# Patient Record
Sex: MALE | URBAN - NONMETROPOLITAN AREA
[De-identification: names, ages, dates, MRNs, and addresses within clinical notes are randomized per-mention and may not be internally consistent; named-entity substitution may affect disease eponyms.]

---

## 2024-06-27 ENCOUNTER — HOSPITAL ENCOUNTER (OUTPATIENT)
Age: 44
Setting detail: SPECIMEN
Discharge: HOME | End: 2024-06-27
Payer: COMMERCIAL

## 2024-06-27 PROCEDURE — 86735 MUMPS ANTIBODY: CPT

## 2024-06-27 PROCEDURE — 86762 RUBELLA ANTIBODY: CPT

## 2024-06-27 PROCEDURE — 86765 RUBEOLA ANTIBODY: CPT

## 2024-06-27 PROCEDURE — 86787 VARICELLA-ZOSTER ANTIBODY: CPT

## 2024-06-28 LAB
MEV IGG SER-ACNC: 1.59
MUV IGG SER IA-ACNC: 0.55
RUBV IGG SERPL QL IA: 85.1 IU/ML
VZV IGG SER QL IA: 2.18

## 2024-08-06 ENCOUNTER — OFFICE VISIT (OUTPATIENT)
Dept: FAMILY MEDICINE CLINIC | Age: 44
End: 2024-08-06
Payer: COMMERCIAL

## 2024-08-06 VITALS
SYSTOLIC BLOOD PRESSURE: 136 MMHG | DIASTOLIC BLOOD PRESSURE: 92 MMHG | OXYGEN SATURATION: 99 % | HEART RATE: 79 BPM | HEIGHT: 69 IN | WEIGHT: 257 LBS | BODY MASS INDEX: 38.06 KG/M2

## 2024-08-06 DIAGNOSIS — Z13.220 SCREENING FOR HYPERLIPIDEMIA: ICD-10-CM

## 2024-08-06 DIAGNOSIS — Z80.0 FAMILY HISTORY OF COLON CANCER: ICD-10-CM

## 2024-08-06 DIAGNOSIS — I10 PRIMARY HYPERTENSION: Primary | ICD-10-CM

## 2024-08-06 DIAGNOSIS — Z13.1 ENCOUNTER FOR SCREENING EXAMINATION FOR IMPAIRED GLUCOSE REGULATION AND DIABETES MELLITUS: ICD-10-CM

## 2024-08-06 DIAGNOSIS — K58.0 IRRITABLE BOWEL SYNDROME WITH DIARRHEA: ICD-10-CM

## 2024-08-06 DIAGNOSIS — Z85.820 HISTORY OF MELANOMA: ICD-10-CM

## 2024-08-06 DIAGNOSIS — Z12.11 SCREENING FOR MALIGNANT NEOPLASM OF COLON: ICD-10-CM

## 2024-08-06 DIAGNOSIS — K21.9 GASTROESOPHAGEAL REFLUX DISEASE WITHOUT ESOPHAGITIS: ICD-10-CM

## 2024-08-06 DIAGNOSIS — F41.1 GAD (GENERALIZED ANXIETY DISORDER): ICD-10-CM

## 2024-08-06 DIAGNOSIS — Q82.5: ICD-10-CM

## 2024-08-06 DIAGNOSIS — Z13.0 SCREENING FOR DEFICIENCY ANEMIA: ICD-10-CM

## 2024-08-06 PROCEDURE — 3080F DIAST BP >= 90 MM HG: CPT | Performed by: NURSE PRACTITIONER

## 2024-08-06 PROCEDURE — 99203 OFFICE O/P NEW LOW 30 MIN: CPT | Performed by: NURSE PRACTITIONER

## 2024-08-06 PROCEDURE — 3075F SYST BP GE 130 - 139MM HG: CPT | Performed by: NURSE PRACTITIONER

## 2024-08-06 RX ORDER — VENLAFAXINE 75 MG/1
75 TABLET ORAL 3 TIMES DAILY
COMMUNITY

## 2024-08-06 RX ORDER — ESOMEPRAZOLE MAGNESIUM 40 MG/1
40 CAPSULE, DELAYED RELEASE ORAL
Qty: 90 CAPSULE | Refills: 1 | Status: SHIPPED | OUTPATIENT
Start: 2024-08-06

## 2024-08-06 RX ORDER — LOSARTAN POTASSIUM 50 MG/1
50 TABLET ORAL DAILY
COMMUNITY
End: 2024-08-06 | Stop reason: SDUPTHER

## 2024-08-06 RX ORDER — HYDROCHLOROTHIAZIDE 25 MG/1
25 TABLET ORAL DAILY
Qty: 30 TABLET | Refills: 2 | Status: SHIPPED | OUTPATIENT
Start: 2024-08-06

## 2024-08-06 RX ORDER — HYDROCHLOROTHIAZIDE 25 MG/1
25 TABLET ORAL DAILY
COMMUNITY
End: 2024-08-06 | Stop reason: SDUPTHER

## 2024-08-06 RX ORDER — LOSARTAN POTASSIUM 50 MG/1
50 TABLET ORAL DAILY
Qty: 30 TABLET | Refills: 2 | Status: SHIPPED | OUTPATIENT
Start: 2024-08-06

## 2024-08-06 SDOH — ECONOMIC STABILITY: HOUSING INSECURITY
IN THE LAST 12 MONTHS, WAS THERE A TIME WHEN YOU DID NOT HAVE A STEADY PLACE TO SLEEP OR SLEPT IN A SHELTER (INCLUDING NOW)?: NO

## 2024-08-06 SDOH — ECONOMIC STABILITY: FOOD INSECURITY: WITHIN THE PAST 12 MONTHS, THE FOOD YOU BOUGHT JUST DIDN'T LAST AND YOU DIDN'T HAVE MONEY TO GET MORE.: NEVER TRUE

## 2024-08-06 SDOH — ECONOMIC STABILITY: FOOD INSECURITY: WITHIN THE PAST 12 MONTHS, YOU WORRIED THAT YOUR FOOD WOULD RUN OUT BEFORE YOU GOT MONEY TO BUY MORE.: NEVER TRUE

## 2024-08-06 SDOH — ECONOMIC STABILITY: INCOME INSECURITY: HOW HARD IS IT FOR YOU TO PAY FOR THE VERY BASICS LIKE FOOD, HOUSING, MEDICAL CARE, AND HEATING?: NOT HARD AT ALL

## 2024-08-06 ASSESSMENT — ENCOUNTER SYMPTOMS
BACK PAIN: 0
BLOOD IN STOOL: 0
DIARRHEA: 0
COUGH: 0
SORE THROAT: 0
ABDOMINAL PAIN: 0
VOMITING: 0
SHORTNESS OF BREATH: 0
NAUSEA: 0
CONSTIPATION: 0

## 2024-08-06 ASSESSMENT — PATIENT HEALTH QUESTIONNAIRE - PHQ9
SUM OF ALL RESPONSES TO PHQ QUESTIONS 1-9: 0
SUM OF ALL RESPONSES TO PHQ9 QUESTIONS 1 & 2: 0
1. LITTLE INTEREST OR PLEASURE IN DOING THINGS: NOT AT ALL
SUM OF ALL RESPONSES TO PHQ QUESTIONS 1-9: 0
SUM OF ALL RESPONSES TO PHQ QUESTIONS 1-9: 0
2. FEELING DOWN, DEPRESSED OR HOPELESS: NOT AT ALL
SUM OF ALL RESPONSES TO PHQ QUESTIONS 1-9: 0

## 2024-08-06 NOTE — PROGRESS NOTES
HPI Notes    Name: Chris Jackson  : 1980         Chief Complaint:     Chief Complaint   Patient presents with    New Patient     Patient here today to establish care with pcp    Dizziness     Patient states he had an episode yesterday and presented to urgent care in [place]. He's currently off of Effexor and thinks that could be what's causing the dizziness.     Hypertension     Pt currently taking losartan 50mg qd and hctz 25mg QD.  Patient would like refills on medication.    Colon Cancer Screening     Pt would like colon cancer screening, states he had it set up prior to moving to [place] so would like to get that established here.        History of Present Illness:        HPI  Pt is a 42 yo male who reports as a new pt. Pt has known HTN. Pt has been on Losartan 50mg and HCTZ 25mg. Well controlled with medication. Has been out of medication for a couple weeks.     Has been on Effexor 75mg TID. Has been wanting to come off of it. Has been weaning down over the past two weeks. Has been experiencing some dizziness since starting the wean. Saturday (8/3/24) was last dose.     Pt wanting to get referral for colonoscopy. Has never had a colonoscopy. Paternal and maternal grandfathers had colon cancer. Has had symptoms IBS-D symptoms since early childhood. Experiences abd cramping and diarrhea 2-3 times per week. No particular food triggers. Does notice that when he eats \"\" symptoms are better. Takes imodium AD \"once in awhile\".     Has acid reflux almost daily. Has been taking nexium OTC for several years. Symptoms are controlled.     States that sometimes feels a \"twinge of pain\" while urinating. Has seems some \"crystal-like things\" in the toilet at times. Denies blood in urine.     Had melanoma of left upper arm removed in 2021.   Past Medical History:     Past Medical History:   Diagnosis Date    Anxiety     Depression     Hypertension     Skin cancer (melanoma) ([facility])       Reviewed all health

## 2024-08-07 ENCOUNTER — OFFICE VISIT (OUTPATIENT)
Dept: GASTROENTEROLOGY | Age: 44
End: 2024-08-07
Payer: COMMERCIAL

## 2024-08-07 ENCOUNTER — TELEPHONE (OUTPATIENT)
Dept: GASTROENTEROLOGY | Age: 44
End: 2024-08-07

## 2024-08-07 VITALS
SYSTOLIC BLOOD PRESSURE: 158 MMHG | DIASTOLIC BLOOD PRESSURE: 88 MMHG | WEIGHT: 257 LBS | OXYGEN SATURATION: 97 % | RESPIRATION RATE: 18 BRPM | BODY MASS INDEX: 37.95 KG/M2 | HEART RATE: 90 BPM

## 2024-08-07 DIAGNOSIS — K21.9 CHRONIC GERD: ICD-10-CM

## 2024-08-07 DIAGNOSIS — K62.5 RECTAL BLEEDING: Primary | ICD-10-CM

## 2024-08-07 DIAGNOSIS — Z80.0 FAMILY HISTORY OF ESOPHAGEAL CANCER: ICD-10-CM

## 2024-08-07 DIAGNOSIS — Z01.818 PRE-OP TESTING: ICD-10-CM

## 2024-08-07 DIAGNOSIS — Z12.11 COLON CANCER SCREENING: ICD-10-CM

## 2024-08-07 DIAGNOSIS — Z80.0 FAMILY HISTORY OF COLON CANCER: ICD-10-CM

## 2024-08-07 PROCEDURE — 99203 OFFICE O/P NEW LOW 30 MIN: CPT | Performed by: NURSE PRACTITIONER

## 2024-08-07 RX ORDER — ASPIRIN 81 MG/1
81 TABLET ORAL DAILY
COMMUNITY

## 2024-08-07 RX ORDER — POLYETHYLENE GLYCOL 3350, SODIUM CHLORIDE, SODIUM BICARBONATE, POTASSIUM CHLORIDE 420; 11.2; 5.72; 1.48 G/4L; G/4L; G/4L; G/4L
4000 POWDER, FOR SOLUTION ORAL ONCE
Qty: 4000 ML | Refills: 0 | Status: SHIPPED | OUTPATIENT
Start: 2024-08-07 | End: 2024-08-07

## 2024-08-07 ASSESSMENT — ENCOUNTER SYMPTOMS
TROUBLE SWALLOWING: 0
ABDOMINAL PAIN: 1
ANAL BLEEDING: 1
ROS SKIN COMMENTS: SKIN CANCER
COUGH: 1

## 2024-08-07 NOTE — TELEPHONE ENCOUNTER
Chris Jackson     1980        male    [address] [place] [address]                    Legal Guardian no  If yes, Name:       Skilled Facility no     If yes, Name:                                             Home Phone: [phone]         Cell Phone:    Telephone Information:   Mobile [phone]                                           Surgeon: Jonathan   Surgery Date: 8/20/24                      Time:     Procedure: colonoscopy and EGD  Duration:    Diagnosis: rectal bleeding R19.5 family h/x esophageal and colon CA Z80.00 GERD K21.9  CPT Codes:47792, 47463    Important Medical History:  In Epic    First Assistant no  Special Inst/Equip/Implants:     Nickel allergy  no  Latex Allergy: no      Cardiac Device:  No  If yes, need most recent pacemaker interrogation from Cardiologist:  Type of pacemaker:    Anesthesia:    MAC                       Admission Type:  Same Day                        Admit Prior to Day of Surgery: No    Case Location:  Ambulatory            Preadmission Testing:  Phone Call             PAT Date and Time:     Need Preop Cardiac Clearance: no  Need Pre-op/Medical Clearance: no    Does Patient have Cardiologist/physician? Name of Physician:        Special Needs Communication:  Ludivina Lift no    needed no

## 2024-08-07 NOTE — PATIENT INSTRUCTIONS
SURVEY:    You may be receiving a survey from [facility] regarding your visit today.    You may get this in the mail, through your MyChart, or in your email.     Please complete the survey to enable us to provide the highest quality of care to you and your family.    If you cannot score us a very good (5 Stars) on any question, please call the office to discuss how we could of made your experience exceptional.    Thank you!    MD Dr. Nora Parada MD Wendy Williams, ANGELINA-EDWIN Keller LPN Brenda Boehler, LPN Jena Adams, MA    Phone: [phone]  Fax: [phone]    Office Hours:   M-TH 8-5, F: 8-12

## 2024-08-07 NOTE — PROGRESS NOTES
[address] [address]    Chief Complaint   Patient presents with    Colon Cancer Screening     Family history of upper GI cancer: Yes (Paternal Grandfather)  Family history of colon cancer: Yes (maternal grandfather)  Blood in stool: Yes   Unintentional weight loss: No  Abdominal pain: Yes (IBS)  Prior colonoscopy: No  Prior EGD: No  Constipation history: No  Number of bowel movements a day: 3-4  Change in stool caliber: Yes  History of GERD or Acid Reflux: Yes          HPI Chris Jackson is a 43 y.o. old male who has a past medical history of anxiety/depression, hypertension, skin cancer presenting as new GI referral for colon cancer screening colonoscopy preop visit with concerns for rectal bleeding and persistent GERD.    Family history of upper GI cancer:  Yes: Paternal Grandfather with esophageal cancer  Family history of colon cancer: Yes: Maternal Grandfather colon cancer  Blood in stool: Yes: infrequent, bright red and on the toilet tissue  Unintentional weight loss: No  Abdominal pain: Yes: Related to his IBS and ongoing from childhood  Prior colonoscopy: No  Prior EGD: No  Constipation history: No  Number of bowel movements a day: 3-4  Change in stool caliber: Yes  History of GERD or Acid Reflux: Yes: years of symptoms.  Has been having a cough felt related to GERD.  No dysphagia       Past Surgical History:   Procedure Laterality Date    SKIN CANCER EXCISION  03/01/2021    from left arm         Current Outpatient Medications   Medication Sig Dispense Refill    polyethylene glycol-electrolytes (NULYTELY WITH FLAVOR PACKS) 420 g solution Take 4,000 mLs by mouth once for 1 dose 4000 mL 0    aspirin 81 MG EC tablet Take 1 tablet by mouth daily      venlafaxine (EFFEXOR) 75 MG tablet Take 1 tablet by mouth 3 times daily      hydroCHLOROthiazide (HYDRODIURIL) 25 MG tablet Take 1 tablet by mouth daily 30 tablet 2    losartan (COZAAR) 50 MG tablet Take 1 tablet by mouth daily 30 tablet 2

## 2024-08-09 ENCOUNTER — HOSPITAL ENCOUNTER (OUTPATIENT)
Age: 44
Discharge: HOME | End: 2024-08-09
Payer: COMMERCIAL

## 2024-08-09 ENCOUNTER — TELEPHONE (OUTPATIENT)
Dept: FAMILY MEDICINE CLINIC | Age: 44
End: 2024-08-09

## 2024-08-09 DIAGNOSIS — Z13.1 ENCOUNTER FOR SCREENING EXAMINATION FOR IMPAIRED GLUCOSE REGULATION AND DIABETES MELLITUS: ICD-10-CM

## 2024-08-09 DIAGNOSIS — Z13.0 SCREENING FOR DEFICIENCY ANEMIA: ICD-10-CM

## 2024-08-09 DIAGNOSIS — Z13.220 SCREENING FOR HYPERLIPIDEMIA: ICD-10-CM

## 2024-08-09 DIAGNOSIS — F41.1 GAD (GENERALIZED ANXIETY DISORDER): ICD-10-CM

## 2024-08-09 DIAGNOSIS — Z01.818 PRE-OP TESTING: ICD-10-CM

## 2024-08-09 DIAGNOSIS — I10 PRIMARY HYPERTENSION: ICD-10-CM

## 2024-08-09 LAB
25(OH)D3 SERPL-MCNC: 22.6 NG/ML (ref 30–100)
ALBUMIN SERPL-MCNC: 4.3 G/DL (ref 3.5–5.2)
ALP SERPL-CCNC: 74 U/L (ref 40–129)
ALT SERPL-CCNC: 32 U/L (ref 5–41)
ANION GAP SERPL CALCULATED.3IONS-SCNC: 15 MMOL/L (ref 9–17)
AST SERPL-CCNC: 28 U/L
BASOPHILS # BLD: 0.02 K/UL (ref 0–0.2)
BASOPHILS NFR BLD: 0 % (ref 0–2)
BILIRUB SERPL-MCNC: 0.3 MG/DL (ref 0.3–1.2)
BUN SERPL-MCNC: 19 MG/DL (ref 6–20)
BUN/CREAT SERPL: 21 (ref 9–20)
CALCIUM SERPL-MCNC: 9.6 MG/DL (ref 8.6–10.4)
CHLORIDE SERPL-SCNC: 100 MMOL/L (ref 98–107)
CHOLEST SERPL-MCNC: 265 MG/DL (ref 0–199)
CHOLESTEROL/HDL RATIO: 7
CO2 SERPL-SCNC: 26 MMOL/L (ref 20–31)
CREAT SERPL-MCNC: 0.9 MG/DL (ref 0.7–1.2)
EOSINOPHIL # BLD: 0.12 K/UL (ref 0–0.4)
EOSINOPHILS RELATIVE PERCENT: 2 % (ref 0–5)
ERYTHROCYTE [DISTWIDTH] IN BLOOD BY AUTOMATED COUNT: 14.5 % (ref 12.1–15.2)
EST. AVERAGE GLUCOSE BLD GHB EST-MCNC: 126 MG/DL
GFR, ESTIMATED: >90 ML/MIN/1.73M2
GLUCOSE SERPL-MCNC: 104 MG/DL (ref 70–99)
HBA1C MFR BLD: 6 % (ref 4–6)
HCT VFR BLD AUTO: 42.6 % (ref 41–53)
HDLC SERPL-MCNC: 39 MG/DL
HGB BLD-MCNC: 14.2 G/DL (ref 13.5–17.5)
IMM GRANULOCYTES # BLD AUTO: 0.03 K/UL (ref 0–0.3)
IMM GRANULOCYTES NFR BLD: 0 % (ref 0–5)
LDLC SERPL CALC-MCNC: ABNORMAL MG/DL (ref 0–100)
LDLC SERPL DIRECT ASSAY-MCNC: 87 MG/DL
LYMPHOCYTES NFR BLD: 3.01 K/UL (ref 1–4.8)
LYMPHOCYTES RELATIVE PERCENT: 37 % (ref 13–44)
MCH RBC QN AUTO: 25.1 PG (ref 26–34)
MCHC RBC AUTO-ENTMCNC: 33.3 G/DL (ref 31–37)
MCV RBC AUTO: 75.3 FL (ref 80–100)
MONOCYTES NFR BLD: 0.52 K/UL (ref 0–1)
MONOCYTES NFR BLD: 6 % (ref 5–9)
NEUTROPHILS NFR BLD: 55 % (ref 39–75)
NEUTS SEG NFR BLD: 4.51 K/UL (ref 2.1–6.5)
PLATELET # BLD AUTO: 284 K/UL (ref 140–450)
PMV BLD AUTO: 8.7 FL (ref 6–12)
POTASSIUM SERPL-SCNC: 3.9 MMOL/L (ref 3.7–5.3)
PROT SERPL-MCNC: 7.4 G/DL (ref 6.4–8.3)
RBC # BLD AUTO: 5.66 M/UL (ref 4.5–5.9)
SODIUM SERPL-SCNC: 141 MMOL/L (ref 135–144)
TRIGL SERPL-MCNC: 853 MG/DL
VLDLC SERPL CALC-MCNC: ABNORMAL MG/DL
WBC OTHER # BLD: 8.2 K/UL (ref 3.5–11)

## 2024-08-09 PROCEDURE — 36415 COLL VENOUS BLD VENIPUNCTURE: CPT

## 2024-08-09 PROCEDURE — 83721 ASSAY OF BLOOD LIPOPROTEIN: CPT

## 2024-08-09 PROCEDURE — 93005 ELECTROCARDIOGRAM TRACING: CPT

## 2024-08-09 PROCEDURE — 80053 COMPREHEN METABOLIC PANEL: CPT

## 2024-08-09 PROCEDURE — 85025 COMPLETE CBC W/AUTO DIFF WBC: CPT

## 2024-08-09 PROCEDURE — 82306 VITAMIN D 25 HYDROXY: CPT

## 2024-08-09 PROCEDURE — 83036 HEMOGLOBIN GLYCOSYLATED A1C: CPT

## 2024-08-09 PROCEDURE — 80061 LIPID PANEL: CPT

## 2024-08-10 LAB
EKG ATRIAL RATE: 75 BPM
EKG P AXIS: 17 DEGREES
EKG P-R INTERVAL: 148 MS
EKG Q-T INTERVAL: 408 MS
EKG QRS DURATION: 106 MS
EKG QTC CALCULATION (BAZETT): 455 MS
EKG R AXIS: 8 DEGREES
EKG T AXIS: 13 DEGREES
EKG VENTRICULAR RATE: 75 BPM

## 2024-08-13 ENCOUNTER — TELEPHONE (OUTPATIENT)
Dept: GASTROENTEROLOGY | Age: 44
End: 2024-08-13

## 2024-08-13 NOTE — TELEPHONE ENCOUNTER
1st attempt to contact patient in regards of rescheduling procedure due to Providers time off on 8/20/24, LVM advising patient to return call to reschedule at later date with Dr. Mcclendon.

## 2024-08-15 NOTE — TELEPHONE ENCOUNTER
2nd attempt to contact patient in regards of rescheduling procedure due to Providers time off on 8/20/24, LVM advising patient to return call to reschedule at later date with Dr. Mcclendon.

## 2024-08-19 ENCOUNTER — OFFICE VISIT (OUTPATIENT)
Dept: FAMILY MEDICINE CLINIC | Age: 44
End: 2024-08-19
Payer: COMMERCIAL

## 2024-08-19 VITALS — HEART RATE: 98 BPM | OXYGEN SATURATION: 97 % | DIASTOLIC BLOOD PRESSURE: 88 MMHG | SYSTOLIC BLOOD PRESSURE: 138 MMHG

## 2024-08-19 DIAGNOSIS — F41.1 GAD (GENERALIZED ANXIETY DISORDER): Primary | ICD-10-CM

## 2024-08-19 DIAGNOSIS — Z01.818 PREOPERATIVE CLEARANCE: ICD-10-CM

## 2024-08-19 PROCEDURE — 99213 OFFICE O/P EST LOW 20 MIN: CPT | Performed by: NURSE PRACTITIONER

## 2024-08-19 RX ORDER — VENLAFAXINE HYDROCHLORIDE 37.5 MG/1
37.5 CAPSULE, EXTENDED RELEASE ORAL DAILY
Qty: 30 CAPSULE | Refills: 0 | Status: SHIPPED | OUTPATIENT
Start: 2024-08-19

## 2024-08-19 ASSESSMENT — ENCOUNTER SYMPTOMS
NAUSEA: 0
VOMITING: 0
DIARRHEA: 0
SHORTNESS OF BREATH: 0
COUGH: 0

## 2024-08-19 NOTE — PROGRESS NOTES
HPI Notes    Name: Chris Jackson  : 1980         Chief Complaint:     Chief Complaint   Patient presents with    Mental Health Problem     Patient still tapering off of effexor. Pt currently taking 75mg qod. Pt would like to try tablets instead of capsules so he can cut them in half.        History of Present Illness:        HPI  Patient is a 44-year-old male who reports for follow-up.  Patient has been wanting to go off of Effexor due to not needing the medication anymore.  Patient is been taking 75 mg p.o. every other day, however still having symptoms of withdrawal.  Patient asking to dose down medication and lieu of cutting them in half.    Patient is also having on EGD and colonoscopy coming up next month.  GI had requested surgical clearance.  EKG was shown to cardiology which stated he was fine for surgery.    Past Medical History:     Past Medical History:   Diagnosis Date    Anxiety     Depression     Hypertension     Skin cancer (melanoma) (HCC)       Reviewed all health maintenance requirements and ordered appropriate tests  Health Maintenance Due   Topic Date Due    HIV screen  Never done    Hepatitis C screen  Never done    Hepatitis B vaccine (1 of 3 - 19+ 3-dose series) Never done    DTaP/Tdap/Td vaccine (1 - Tdap) Never done    Colorectal Cancer Screen  Never done    COVID-19 Vaccine ( -  season) Never done    Flu vaccine (1) Never done       Past Surgical History:     Past Surgical History:   Procedure Laterality Date    SKIN CANCER EXCISION  2021    from left arm        Medications:       Prior to Admission medications    Medication Sig Start Date End Date Taking? Authorizing Provider   venlafaxine (EFFEXOR XR) 37.5 MG extended release capsule Take 1 capsule by mouth daily 24  Yes Cory Ernandez DNP   aspirin 81 MG EC tablet Take 1 tablet by mouth daily   Yes Provider, MD Kandace   hydroCHLOROthiazide (HYDRODIURIL) 25 MG tablet Take 1 tablet by mouth daily 24

## 2024-09-11 ENCOUNTER — OFFICE VISIT (OUTPATIENT)
Dept: FAMILY MEDICINE CLINIC | Age: 44
End: 2024-09-11
Payer: COMMERCIAL

## 2024-09-11 VITALS
TEMPERATURE: 98 F | WEIGHT: 260 LBS | OXYGEN SATURATION: 97 % | SYSTOLIC BLOOD PRESSURE: 128 MMHG | BODY MASS INDEX: 38.4 KG/M2 | DIASTOLIC BLOOD PRESSURE: 84 MMHG

## 2024-09-11 DIAGNOSIS — L03.114 CELLULITIS OF ARM, LEFT: Primary | ICD-10-CM

## 2024-09-11 PROCEDURE — 99213 OFFICE O/P EST LOW 20 MIN: CPT | Performed by: FAMILY MEDICINE

## 2024-09-11 RX ORDER — CEPHALEXIN 500 MG/1
500 CAPSULE ORAL 3 TIMES DAILY
Qty: 21 CAPSULE | Refills: 0 | Status: SHIPPED | OUTPATIENT
Start: 2024-09-11 | End: 2024-09-18

## 2024-09-11 ASSESSMENT — ENCOUNTER SYMPTOMS
VOMITING: 0
NAUSEA: 0

## 2024-11-18 RX ORDER — LOSARTAN POTASSIUM 50 MG/1
50 TABLET ORAL DAILY
Qty: 30 TABLET | Refills: 2 | Status: SHIPPED | OUTPATIENT
Start: 2024-11-18

## 2024-11-18 RX ORDER — HYDROCHLOROTHIAZIDE 25 MG/1
25 TABLET ORAL DAILY
Qty: 30 TABLET | Refills: 2 | Status: SHIPPED | OUTPATIENT
Start: 2024-11-18

## 2024-11-18 NOTE — TELEPHONE ENCOUNTER
Rx refill request via surescripts  Losartan 50mg qd, hctz 25mg  Last OV for HTN 8-6-24   Next appt- none

## 2025-01-03 NOTE — PROGRESS NOTES
[facility]   Preadmission Testing    Name: Chris Jackson  : 1980  Patient Phone: [phone] (home) [phone] (work)    Procedure: COLONOSCOPY  EGD  Date of Procedure: 2025  Surgeon: Lj Mcclendon MD    Ht:  175.3 cm (5' 9\")  Wt: 115.7 kg (255 lb)  Wt method: Stated    Allergies: No Known Allergies             There were no vitals filed for this visit.    No LMP for male patient.    Do you take blood thinners?   [] Yes    [x] No         Instructed to stop blood thinners prior to procedure?    [] Yes    [] No      [x] N/A   Do you have sleep apnea?   [] Yes    [x] No     Do you have acid reflux ?   [x] Yes    [] No     Do you have  hiatal hernia?   [] Yes    [x] No    Do you ever experience motion sickness?   [] Yes    [x] No     Have you had a respiratory infection or sore throat in last 4 weeks before surgery?    [] Yes    [x] No     Do you have poorly controlled asthma or COPD?  Difficulty with intubation in past? [] Yes    [x] No      [] Yes    [x] No       Do you have a history of angina in the last month or symptomatic arrhythmia?   [] Yes    [x] No     Do you have significant central nervous system disease?   [] Yes    [x] No     Have you had an EKG, labs, or chest xray in last 12 months?  If yes provide copies to anesthesia   [x] Yes    [] No       [x] Lab    [] EKG    [] CXR     Have you had a stress test?     [] Yes    [x] No    When/where:    Was it normal?    [] Yes    [] No     Do you or your family have a history of Malignant Hyperthermia?   [] Yes    [x] No           Do you smoke? [] Yes    [x] No      Please refrain from smoking on the day of surgery.      Patient instructed on: [x] NPO Status   [x] Meds to Take  [] Hold GLP-1 Receptor Agonist  [x] Ride Home  [x] No Jewelry/Contact Lenses/Nail Polish  [x] Prep/Lax/Clear Liquids    [] Chlorhexidene     DOS Patient Needs [] HCG   [] Blood Sugar  [] PT/INR    [] T&S       Do you have any metal allergies? [] Yes    [x] No

## 2025-01-08 ENCOUNTER — ANESTHESIA EVENT (OUTPATIENT)
Dept: ENDOSCOPY | Age: 45
End: 2025-01-08
Payer: COMMERCIAL

## 2025-01-09 ENCOUNTER — HOSPITAL ENCOUNTER (OUTPATIENT)
Age: 45
Setting detail: OUTPATIENT SURGERY
Discharge: HOME | End: 2025-01-09
Attending: SURGERY | Admitting: SURGERY
Payer: COMMERCIAL

## 2025-01-09 ENCOUNTER — ANESTHESIA (OUTPATIENT)
Dept: ENDOSCOPY | Age: 45
End: 2025-01-09
Payer: COMMERCIAL

## 2025-01-09 VITALS
HEART RATE: 68 BPM | WEIGHT: 255 LBS | HEIGHT: 69 IN | SYSTOLIC BLOOD PRESSURE: 120 MMHG | TEMPERATURE: 96.8 F | DIASTOLIC BLOOD PRESSURE: 83 MMHG | BODY MASS INDEX: 37.77 KG/M2 | RESPIRATION RATE: 19 BRPM | OXYGEN SATURATION: 99 %

## 2025-01-09 DIAGNOSIS — Z12.11 ENCOUNTER FOR SCREENING COLONOSCOPY: ICD-10-CM

## 2025-01-09 DIAGNOSIS — Z80.0 FAMILY HISTORY OF ESOPHAGEAL CANCER: ICD-10-CM

## 2025-01-09 PROBLEM — K21.9 GERD (GASTROESOPHAGEAL REFLUX DISEASE): Status: ACTIVE | Noted: 2025-01-09

## 2025-01-09 PROCEDURE — 43239 EGD BIOPSY SINGLE/MULTIPLE: CPT | Performed by: SURGERY

## 2025-01-09 PROCEDURE — 7100000011 HC PHASE II RECOVERY - ADDTL 15 MIN: Performed by: SURGERY

## 2025-01-09 PROCEDURE — 3609012400 HC EGD TRANSORAL BIOPSY SINGLE/MULTIPLE: Performed by: SURGERY

## 2025-01-09 PROCEDURE — 88305 TISSUE EXAM BY PATHOLOGIST: CPT

## 2025-01-09 PROCEDURE — 3700000000 HC ANESTHESIA ATTENDED CARE: Performed by: SURGERY

## 2025-01-09 PROCEDURE — 6360000002 HC RX W HCPCS: Performed by: NURSE ANESTHETIST, CERTIFIED REGISTERED

## 2025-01-09 PROCEDURE — 2580000003 HC RX 258: Performed by: SURGERY

## 2025-01-09 PROCEDURE — 7100000010 HC PHASE II RECOVERY - FIRST 15 MIN: Performed by: SURGERY

## 2025-01-09 PROCEDURE — 2709999900 HC NON-CHARGEABLE SUPPLY: Performed by: SURGERY

## 2025-01-09 PROCEDURE — 3609027000 HC COLONOSCOPY: Performed by: SURGERY

## 2025-01-09 PROCEDURE — 45378 DIAGNOSTIC COLONOSCOPY: CPT | Performed by: SURGERY

## 2025-01-09 PROCEDURE — 3700000001 HC ADD 15 MINUTES (ANESTHESIA): Performed by: SURGERY

## 2025-01-09 RX ORDER — LIDOCAINE HYDROCHLORIDE 10 MG/ML
INJECTION, SOLUTION EPIDURAL; INFILTRATION; INTRACAUDAL; PERINEURAL
Status: DISCONTINUED | OUTPATIENT
Start: 2025-01-09 | End: 2025-01-09 | Stop reason: SDUPTHER

## 2025-01-09 RX ORDER — PROPOFOL 10 MG/ML
INJECTION, EMULSION INTRAVENOUS
Status: DISCONTINUED | OUTPATIENT
Start: 2025-01-09 | End: 2025-01-09 | Stop reason: SDUPTHER

## 2025-01-09 RX ORDER — FENTANYL CITRATE 50 UG/ML
INJECTION, SOLUTION INTRAMUSCULAR; INTRAVENOUS
Status: DISCONTINUED | OUTPATIENT
Start: 2025-01-09 | End: 2025-01-09 | Stop reason: SDUPTHER

## 2025-01-09 RX ORDER — SODIUM CHLORIDE, SODIUM LACTATE, POTASSIUM CHLORIDE, CALCIUM CHLORIDE 600; 310; 30; 20 MG/100ML; MG/100ML; MG/100ML; MG/100ML
INJECTION, SOLUTION INTRAVENOUS CONTINUOUS
Status: DISCONTINUED | OUTPATIENT
Start: 2025-01-09 | End: 2025-01-09 | Stop reason: HOSPADM

## 2025-01-09 RX ORDER — MIDAZOLAM HYDROCHLORIDE 1 MG/ML
INJECTION, SOLUTION INTRAMUSCULAR; INTRAVENOUS
Status: DISCONTINUED | OUTPATIENT
Start: 2025-01-09 | End: 2025-01-09 | Stop reason: SDUPTHER

## 2025-01-09 RX ADMIN — LIDOCAINE HYDROCHLORIDE 50 MG: 10 INJECTION, SOLUTION EPIDURAL; INFILTRATION; INTRACAUDAL; PERINEURAL at 10:10

## 2025-01-09 RX ADMIN — PROPOFOL 50 MG: 10 INJECTION, EMULSION INTRAVENOUS at 10:13

## 2025-01-09 RX ADMIN — FENTANYL CITRATE 50 MCG: 50 INJECTION, SOLUTION INTRAMUSCULAR; INTRAVENOUS at 10:02

## 2025-01-09 RX ADMIN — PROPOFOL 75 MCG/KG/MIN: 10 INJECTION, EMULSION INTRAVENOUS at 10:17

## 2025-01-09 RX ADMIN — MIDAZOLAM 2 MG: 1 INJECTION INTRAMUSCULAR; INTRAVENOUS at 10:02

## 2025-01-09 RX ADMIN — PROPOFOL 150 MG: 10 INJECTION, EMULSION INTRAVENOUS at 10:10

## 2025-01-09 RX ADMIN — PROPOFOL 50 MG: 10 INJECTION, EMULSION INTRAVENOUS at 10:17

## 2025-01-09 RX ADMIN — SODIUM CHLORIDE, POTASSIUM CHLORIDE, SODIUM LACTATE AND CALCIUM CHLORIDE: 600; 310; 30; 20 INJECTION, SOLUTION INTRAVENOUS at 09:06

## 2025-01-09 RX ADMIN — PROPOFOL 30 MG: 10 INJECTION, EMULSION INTRAVENOUS at 10:20

## 2025-01-09 RX ADMIN — PROPOFOL 50 MG: 10 INJECTION, EMULSION INTRAVENOUS at 10:15

## 2025-01-09 RX ADMIN — FENTANYL CITRATE 50 MCG: 50 INJECTION, SOLUTION INTRAMUSCULAR; INTRAVENOUS at 10:10

## 2025-01-09 ASSESSMENT — PAIN SCALES - GENERAL: PAINLEVEL_OUTOF10: 0

## 2025-01-09 ASSESSMENT — PAIN - FUNCTIONAL ASSESSMENT: PAIN_FUNCTIONAL_ASSESSMENT: 0-10

## 2025-01-09 NOTE — ANESTHESIA POSTPROCEDURE EVALUATION
Department of Anesthesiology  Postprocedure Note    Patient: Chris Jackson  MRN: [id number]  YOB: 1980  Date of evaluation: 1/9/2025    Procedure Summary       Date: 01/09/25 Room / Location: [address] [id number] / [facility] ENDOSCOPY    Anesthesia Start: 1002 Anesthesia Stop: 1036    Procedures:       COLONOSCOPY (Abdomen)      EGD (Abdomen) Diagnosis:       Encounter for screening colonoscopy      Family history of esophageal cancer      (Encounter for screening colonoscopy [Z12.11])      (Family history of esophageal cancer [Z80.0])    Surgeons: Lj Mcclendon MD Responsible Provider: Gerardo Branham APRN - CRNA    Anesthesia Type: Not recorded ASA Status: Not recorded            Anesthesia Type: No value filed.    Nik Phase I: Nik Score: 10    Nik Phase II:      Anesthesia Post Evaluation    Patient location during evaluation: PACU  Patient participation: complete - patient participated  Level of consciousness: awake and lethargic  Pain score: 0  Airway patency: patent  Nausea & Vomiting: no nausea and no vomiting  Cardiovascular status: hemodynamically stable  Respiratory status: acceptable, nonlabored ventilation, room air and spontaneous ventilation  Hydration status: euvolemic  Multimodal analgesia pain management approach  Pain management: adequate and satisfactory to patient    No notable events documented.

## 2025-01-09 NOTE — DISCHARGE INSTRUCTIONS
1) You have a very small hiatal hernia.  This is not necessarily a problem by itself, but could make you more vulnerable to GERD.  2) No obvious ulcer etc.  Biopsies were done to look for h. Pylori.  2) Your colon looks good.  You do have some small internal hemorrhoids.  4) In the absence of any new symptoms or change in you history patient should consider a repeat colonoscopy in 10 years for colon cancer screening.  5) Follow up in the office as desired.    Discharge Instructions for EGD     An EGD or upper GI endoscopy is a visual exam of the lining of the esophagus, stomach (gastric) and duodenum (the first part of the small intestine). An endoscopy is a flexible tube with a light and a viewing device. It allows the doctor to view the inside of the colon through a tiny video camera.   EGD is performed for many reasons: unexplained anemia , pain, bleeding, heart burn, among many other reasons.   Complications from a EGD are rare. Some possible serious complications include perforated bowel (which might require surgery) and bleeding (which could require blood transfusion ). Minor complications include bloating, gas, and cramping that can last for 1-2 days after the procedure.     Because air is put into your upper GI during the procedure, it is normal to pass large amounts of air from your rectum and burp a lot.     What You Will Need   Someone to drive you home after the procedure    Steps to Take   Home Care    Rest when you get home.    Because the sedative will make you drowsy, don't drive, operate machinery, or make important decisions the day of the procedure.      Feelings of bloating, gas, or cramping may persist for 24 hours.   Diet    Try sips of water first. If tolerated, resume regular diet or the diet recommended by your physician.    Do not drink alcohol for 24 hours.    Physical Activity    You may return to work tomorrow.    Do not drive, operate heavy machinery, or do activities that require

## 2025-01-09 NOTE — H&P
Name:  Chris Jackson  Age:  44 y.o.   :  1980    Physician: TYE MCCLAIN MD       Chief Complaint: Colon Cancer Screening, GERD      HPI:  Patient has history of \"gastritis\" require ED visit about 1 year ago in [place] (the state). Ongoing issue with GERD despite using Nexium.         MEDICAL HISTORY:    Past Medical History:        Diagnosis Date    Anxiety     Depression     Hypertension     Skin cancer (melanoma) (HCC)        Past Surgical History:        Procedure Laterality Date    SKIN CANCER EXCISION  2021    from left arm       Prior to Admission medications    Medication Sig Start Date End Date Taking? Authorizing Provider   losartan (COZAAR) 50 MG tablet Take 1 tablet by mouth once daily 24  Yes Cory Ernandez DNP   hydroCHLOROthiazide (HYDRODIURIL) 25 MG tablet Take 1 tablet by mouth once daily 24  Yes Cory Ernandez DNP   esomeprazole (NEXIUM) 40 MG delayed release capsule Take 1 capsule by mouth every morning (before breakfast) 24  Yes Cory Ernandez DNP   aspirin 81 MG EC tablet Take 1 tablet by mouth daily    Provider, MD Kandace       No Known Allergies     reports that he quit smoking about 11 years ago. His smoking use included cigarettes. He has never used smokeless tobacco.  reports current alcohol use of about 10.0 standard drinks of alcohol per week.    Family History   Problem Relation Age of Onset    Diabetes Father     Hypertension Father     Colon Cancer Maternal Grandfather     Esophageal Cancer Paternal Grandfather             REVIEW OF SYSTEMS:  General:  negative  Eye:  negative   ENT:negative  Allergy/Immunology:  negative  Hematology/Lymphatic: negative  Lungs: negative  Cardiovascular: negative  Gastrointestinal: negative  : negative  Neurological: negative      PHYSICAL EXAM:    /88   Pulse 71   Temp 97.8 °F (36.6 °C) (Temporal)   Resp 20   Ht 1.753 m (5' 9\")   Wt 115.7 kg (255 lb)   SpO2 100%   BMI 37.66 kg/m²        Gen: Alert and oriented x3, no acute distress, well-appearing    Eyes: PERRL, Sclera Anicteric    Head: Normocephalic, non tender     Neck: Supple, no significant adenopathy. No carotid bruits, thyroid normal size and no masses    Chest: CTA, no wheezes, no rales, no rhonchi, symmetrical    Heart: Normal rate, regular rhythm, no murmurs    Abdomen: Soft, positive bowel sounds, non tender, non distended, no masses, no hernias, no HSM, no bruits.    Neuro: Normal speech, motor/sensory grossly normal bilateral    MSK: No joint tenderness, deformity, or swelling           ASSESSMENT:  1) GERD  2) History of gastritis  3) Colon Cancer Screening    PLAN:  1) EGD and Colonoscopy - Risks and benefits of colonoscopy and EGD (upper G.I. Endoscopy) were discussed with Chris Jackson.  In particular I discussed the nature and limitations of the procedures, the possibility of incomplete colonoscopy and failure to make a diagnosis with either procedure.  I also discussed the risks and consequences of reactions to the sedatives, bleeding and perforation.  Alternate ways of evaluating the colon including barium enema, CT colonography and sigmoidoscopy were discussed, and alternate ways of evaluating the upper g.i tract were also discussed including barium swallow and CT scan were discussed.  he  was also given the opportunity to have any questions answered, and encouraged to call the office with additional issues.         Electronically signed by TYE MCCLAIN MD on 1/9/2025 at 9:05 AM

## 2025-01-09 NOTE — ANESTHESIA PRE PROCEDURE
Department of Anesthesiology  Preprocedure Note       Name:  Chris Jackson   Age:  44 y.o.  :  1980                                          MRN:  [id number]         Date:  2025      Surgeon: Surgeon(s):  Lj Mcclendon MD    Procedure: Procedure(s):  COLONOSCOPY  EGD    Medications prior to admission:   Prior to Admission medications    Medication Sig Start Date End Date Taking? Authorizing Provider   losartan (COZAAR) 50 MG tablet Take 1 tablet by mouth once daily 24  Yes Cory Ernandez DNP   hydroCHLOROthiazide (HYDRODIURIL) 25 MG tablet Take 1 tablet by mouth once daily 24  Yes Cory Ernandez DNP   esomeprazole (NEXIUM) 40 MG delayed release capsule Take 1 capsule by mouth every morning (before breakfast) 24  Yes Cory Ernandez DNP   aspirin 81 MG EC tablet Take 1 tablet by mouth daily    Provider, MD Kandace       Current medications:    Current Facility-Administered Medications   Medication Dose Route Frequency Provider Last Rate Last Admin    lactated ringers infusion   IntraVENous Continuous Lj Mcclendon  mL/hr at 25 1002 NoRateChange at 25 1002       Allergies:  No Known Allergies    Problem List:    Patient Active Problem List   Diagnosis Code    Colon cancer screening Z12.11    GERD (gastroesophageal reflux disease) K21.9       Past Medical History:        Diagnosis Date    Anxiety     Depression     Hypertension     Skin cancer (melanoma) (HCC)        Past Surgical History:        Procedure Laterality Date    SKIN CANCER EXCISION  2021    from left arm       Social History:    Social History     Tobacco Use    Smoking status: Former     Current packs/day: 0.00     Types: Cigarettes     Quit date: 2014     Years since quittin.0    Smokeless tobacco: Never   Substance Use Topics    Alcohol use: Yes     Alcohol/week: 10.0 standard drinks of alcohol     Types: 10 Cans of beer per week     Comment: 10/week

## 2025-01-09 NOTE — PROGRESS NOTES
Spiritual Services Interventions  [facility] ENDO Pool/NONE   1/9/2025  Sr Candida Garnettman  44 y.o. year old male    Encounter Summary  Encounter Overview/Reason: Initial Encounter  Service Provided For: Patient  Referral/Consult From: [facility] [place]  Support System: Family members  Last Encounter : 01/09/25  Complexity of Encounter: Low  Begin Time: 0925  End Time : 0930  Total Time Calculated: 5 min     Spiritual/Emotional needs  Type: Spiritual Support                    Assessment/Intervention/Outcome  Assessment: Calm  Intervention: Sustaining Presence/Ministry of presence  Outcome: Expressed Gratitude

## 2025-01-09 NOTE — OP NOTE
Patient: Chris Jackson  YOB: 1980  MRN: [id number]    Date of Procedure: 1/9/2025    Pre-operative Diagnosis: GERD, Colon Cancer Screening    Post-operative Diagnosis:  Grossly normal EGD (1 cm hiatal hernia), Normal colonoscopy (minimal hemorrhoids)    Procedure: EGD + biopsies, Colonoscopy    Surgeon(s):  Lj Mcclendon MD    Assistant:   * No surgical staff found *    Anesthesia: Monitor Anesthesia Care    Estimated Blood Loss (mL): Minimal    Complications: None    Specimens:   ID Type Source Tests Collected by Time Destination   A : A. Antrum biopsy Tissue Stomach SURGICAL PATHOLOGY Lj Mcclendon MD 1/9/2025 1012    B : B. Biopsy of body of stomach Tissue Stomach SURGICAL PATHOLOGY Lj Mcclendon MD 1/9/2025 1013        Implants:  * No implants in log *      Drains: * No LDAs found *    Patient was brought to the endoscopy area and IV sedation was induced.  Scope was been his mouth on his esophagus into his stomach and duodenum..  Grossly everything looks fairly normal.  No obvious esophagitis gastritis peptic ulcer disease etc.  With his symptoms I did go ahead and do some biopsies of antrum and body of the stomach to look for H. pylori and other underlying inflammation.  Even with a normal scope he can still have fairly significant GERD.    Repositioned and colonoscopy was performed.  BSS 6, left 2, transverse 2, right 2    His prep was good.  He had some retained opaque slightly pasty stool throughout his colon most this could be irrigated and suctioned away without too much difficulty.  He has a somewhat long limit tortuous colon did take some work to get the scope around into the cecum.  Both the ileocecal valve and the appendiceal orifice were visualized.  From there the scope was slowly and carefully withdrawn.  Use narrowband imaging on the way out also the Endo cuff device scope.  No evidence of any tumors, polyps, active inflammation was seen.  Retroflex scope in the rectum he

## 2025-01-11 LAB — SURGICAL PATHOLOGY REPORT: NORMAL

## 2025-01-14 ENCOUNTER — TELEPHONE (OUTPATIENT)
Dept: SURGERY | Age: 45
End: 2025-01-14

## 2025-02-08 PROBLEM — Z12.11 COLON CANCER SCREENING: Status: RESOLVED | Noted: 2025-01-09 | Resolved: 2025-02-08

## 2025-02-18 RX ORDER — LOSARTAN POTASSIUM 50 MG/1
50 TABLET ORAL DAILY
Qty: 30 TABLET | Refills: 0 | Status: SHIPPED | OUTPATIENT
Start: 2025-02-18

## 2025-02-18 RX ORDER — HYDROCHLOROTHIAZIDE 25 MG/1
25 TABLET ORAL DAILY
Qty: 30 TABLET | Refills: 0 | Status: SHIPPED | OUTPATIENT
Start: 2025-02-18

## 2025-02-18 NOTE — TELEPHONE ENCOUNTER
Rx refill request via surescripts  Hctz 25mg qd, losartan 50mg qd  Last OV 8-19-24  Next appt- none

## 2025-03-23 DIAGNOSIS — I10 PRIMARY HYPERTENSION: Primary | ICD-10-CM

## 2025-03-24 RX ORDER — HYDROCHLOROTHIAZIDE 25 MG/1
25 TABLET ORAL DAILY
Qty: 30 TABLET | Refills: 0 | Status: SHIPPED | OUTPATIENT
Start: 2025-03-24 | End: 2025-03-25 | Stop reason: SDUPTHER

## 2025-03-24 RX ORDER — LOSARTAN POTASSIUM 50 MG/1
50 TABLET ORAL DAILY
Qty: 30 TABLET | Refills: 0 | Status: SHIPPED | OUTPATIENT
Start: 2025-03-24 | End: 2025-03-25 | Stop reason: SDUPTHER

## 2025-03-24 NOTE — TELEPHONE ENCOUNTER
Rx refill request via surescripts  Hctz 25mg qd, losartan 50mg qd  Last OV 8-6-24 as new patient/HTN  Next appt- none

## 2025-03-25 ENCOUNTER — OFFICE VISIT (OUTPATIENT)
Dept: FAMILY MEDICINE CLINIC | Age: 45
End: 2025-03-25
Payer: COMMERCIAL

## 2025-03-25 VITALS
HEIGHT: 69 IN | SYSTOLIC BLOOD PRESSURE: 136 MMHG | DIASTOLIC BLOOD PRESSURE: 90 MMHG | WEIGHT: 267 LBS | HEART RATE: 93 BPM | OXYGEN SATURATION: 96 % | BODY MASS INDEX: 39.55 KG/M2

## 2025-03-25 DIAGNOSIS — K21.9 GASTROESOPHAGEAL REFLUX DISEASE WITHOUT ESOPHAGITIS: ICD-10-CM

## 2025-03-25 DIAGNOSIS — F41.1 GAD (GENERALIZED ANXIETY DISORDER): ICD-10-CM

## 2025-03-25 DIAGNOSIS — I10 PRIMARY HYPERTENSION: Primary | ICD-10-CM

## 2025-03-25 PROCEDURE — 3080F DIAST BP >= 90 MM HG: CPT | Performed by: NURSE PRACTITIONER

## 2025-03-25 PROCEDURE — 99214 OFFICE O/P EST MOD 30 MIN: CPT | Performed by: NURSE PRACTITIONER

## 2025-03-25 PROCEDURE — 3075F SYST BP GE 130 - 139MM HG: CPT | Performed by: NURSE PRACTITIONER

## 2025-03-25 RX ORDER — LOSARTAN POTASSIUM 50 MG/1
50 TABLET ORAL DAILY
Qty: 90 TABLET | Refills: 1 | Status: SHIPPED | OUTPATIENT
Start: 2025-03-25

## 2025-03-25 RX ORDER — HYDROCHLOROTHIAZIDE 25 MG/1
25 TABLET ORAL DAILY
Qty: 90 TABLET | Refills: 1 | Status: SHIPPED | OUTPATIENT
Start: 2025-03-25

## 2025-03-25 RX ORDER — ESOMEPRAZOLE MAGNESIUM 40 MG/1
40 CAPSULE, DELAYED RELEASE ORAL
Qty: 90 CAPSULE | Refills: 1 | Status: SHIPPED | OUTPATIENT
Start: 2025-03-25

## 2025-03-25 SDOH — ECONOMIC STABILITY: FOOD INSECURITY: WITHIN THE PAST 12 MONTHS, THE FOOD YOU BOUGHT JUST DIDN'T LAST AND YOU DIDN'T HAVE MONEY TO GET MORE.: NEVER TRUE

## 2025-03-25 SDOH — ECONOMIC STABILITY: FOOD INSECURITY: WITHIN THE PAST 12 MONTHS, YOU WORRIED THAT YOUR FOOD WOULD RUN OUT BEFORE YOU GOT MONEY TO BUY MORE.: NEVER TRUE

## 2025-03-25 ASSESSMENT — ENCOUNTER SYMPTOMS
DIARRHEA: 0
VOMITING: 0
HEARTBURN: 0
SHORTNESS OF BREATH: 0
COUGH: 0
SORE THROAT: 0
GLOBUS SENSATION: 0
FEELING OF CHOKING: 0
NAUSEA: 0

## 2025-03-25 ASSESSMENT — PATIENT HEALTH QUESTIONNAIRE - PHQ9
SUM OF ALL RESPONSES TO PHQ QUESTIONS 1-9: 0
2. FEELING DOWN, DEPRESSED OR HOPELESS: NOT AT ALL
1. LITTLE INTEREST OR PLEASURE IN DOING THINGS: NOT AT ALL

## 2025-03-25 NOTE — PROGRESS NOTES
HPI Notes    Name: Chris Jackson  : 1980         Chief Complaint:     Chief Complaint   Patient presents with    Hypertension     Losartan 25mg HCTZ 25mg qd.     Anxiety     Patient states he's doing alright       History of Present Illness:        Hypertension  This is a chronic problem. The current episode started more than 1 year ago. The problem has been gradually improving since onset. The problem is controlled. Associated symptoms include anxiety. Pertinent negatives include no chest pain, headaches, palpitations or shortness of breath. Risk factors for coronary artery disease include male gender and obesity. Past treatments include angiotensin blockers. The current treatment provides moderate improvement. There are no compliance problems.    Anxiety  Presents for follow-up visit. Patient reports no chest pain, dizziness, feeling of choking, nausea, nervous/anxious behavior, palpitations, panic or shortness of breath. Symptoms occur rarely. The severity of symptoms is mild.       Gastroesophageal Reflux  He reports no chest pain, no coughing, no globus sensation, no heartburn, no nausea or no sore throat. This is a chronic problem. The current episode started more than 1 year ago. The problem occurs rarely (once or twice a month). Nothing aggravates the symptoms. Risk factors include caffeine use, lack of exercise and obesity. He has tried a PPI for the symptoms. The treatment provided moderate relief.       Past Medical History:     Past Medical History:   Diagnosis Date    Anxiety     Depression     Hypertension     Skin cancer (melanoma) (HCC)       Reviewed all health maintenance requirements and ordered appropriate tests  Health Maintenance Due   Topic Date Due    HIV screen  Never done    Hepatitis C screen  Never done    Hepatitis B vaccine (1 of 3 - 19+ 3-dose series) Never done    DTaP/Tdap/Td vaccine (1 - Tdap) Never done    Flu vaccine (1) Never done    COVID-19 Vaccine ( season)

## 2025-05-08 ENCOUNTER — TELEPHONE (OUTPATIENT)
Dept: FAMILY MEDICINE CLINIC | Age: 45
End: 2025-05-08

## 2025-05-08 NOTE — TELEPHONE ENCOUNTER
Attempted to reach patient via phone call. Voicemail was left for pt to contact the office for an appointment.

## 2025-08-01 ENCOUNTER — NURSE TRIAGE (OUTPATIENT)
Dept: OTHER | Facility: CLINIC | Age: 45
End: 2025-08-01

## 2025-08-01 NOTE — TELEPHONE ENCOUNTER
Location of patient: [place]      Location of employment: [employer] [place] occurred: [place]     Location of injury (body part involved): None     Time of injury: Around 2:00pm     Last 4 of SSN: [id number]     Location associate referred to for care: None     Subjective: Caller states \"In the OR today, the vacuum system activation system went down and was exposed to sevoflurane and nitrous\"     Current Symptoms: No      Pain Severity: Na     Temperature: NA      What has been tried: Nothing        Recommended disposition: Call posion control center         Care advice provided, caller verbalizes understanding; denies any other questions or concerns.     Agrees to call Poison Control       Reason for Disposition   All other POTENTIALLY POISONOUS SUBSTANCES (e.g., nearly all chemicals, plants) (Exception: Known harmless substances or asymptomatic double dose of OTC drug.)    Answer Assessment - Initial Assessment Questions  1. SUBSTANCE: \"What was swallowed?\" If necessary, have the caller look at the product or drug label on the container to determine active ingredients.      Not swallowed - inhaled sevoflurane and nitrous  2. AMOUNT: \"How much was swallowed?\" (e.g., what was the possible maximum amount)       Unknown   3. ONSET: \"When was it probably swallowed?\" (e.g., minutes, hours ago)       NA  4. SYMPTOMS: \"Do you have any symptoms?\" If Yes, ask: \"What are they?\" (e.g., abdomen pain, vomiting, weakness)       No  5. TREATMENT: \"Have you done anything to treat this?\" If Yes, ask: \"What did you do?\"      No  6. SUICIDAL: \"Did you take this to hurt or kill yourself?\"      No   7. PREGNANCY: \"Is there any chance you are pregnant?\" \"When was your last menstrual period?\"      NA    Protocols used: Poisoning-ADULT-[place]

## 2025-08-20 DIAGNOSIS — I10 PRIMARY HYPERTENSION: ICD-10-CM

## 2025-08-20 RX ORDER — ESOMEPRAZOLE MAGNESIUM 40 MG/1
40 CAPSULE, DELAYED RELEASE ORAL
Qty: 90 CAPSULE | Refills: 1 | OUTPATIENT
Start: 2025-08-20

## 2025-08-20 RX ORDER — LOSARTAN POTASSIUM 50 MG/1
50 TABLET ORAL DAILY
Qty: 90 TABLET | Refills: 1 | OUTPATIENT
Start: 2025-08-20

## 2025-08-20 RX ORDER — HYDROCHLOROTHIAZIDE 25 MG/1
25 TABLET ORAL DAILY
Qty: 90 TABLET | Refills: 1 | OUTPATIENT
Start: 2025-08-20

## (undated) DEVICE — SYRINGE MED 50ML LUERSLIP TIP

## (undated) DEVICE — SOLUTION IRRIG 1000ML STRL H2O USP PLAS POUR BTL

## (undated) DEVICE — COLONOSCOPE ENDOSCP ABSORBENT SM PEDIATRIC 104 MM VISION

## (undated) DEVICE — HYBRID TUBING WITH CO2 CONNECTION FOR OLYMPUS 140/160/180/190 SERIES GI ENDOSCOPES WITH OLYMPUS AFU-100 , OLYMPUS OFP: Brand: ENDOGATOR HYBRID IRRIGATION TUBING

## (undated) DEVICE — 4-PORT MANIFOLD: Brand: NEPTUNE 2

## (undated) DEVICE — ENDO KIT W/SYRINGE: Brand: MEDLINE INDUSTRIES, INC.

## (undated) DEVICE — FORCEPS BX JUMBO L240CM DIA2.8MM WRK CHN 3.2MM ORNG W/ NDL

## (undated) DEVICE — Device: Brand: DEFENDO VALVE AND CONNECTOR KIT

## (undated) DEVICE — FORCEPS BX L240CM JAW DIA22MM ORNG STD CAP W NDL RAD JAW 4

## (undated) DEVICE — BITE BLK ENDOSCP AD 54FR GRN POLYETH ENDOSCP W STRP SLD